# Patient Record
(demographics unavailable — no encounter records)

---

## 2017-06-24 NOTE — NUR
C/C CHEST PAIN / PRESSURE SINCE 0500 THIS AM. NON RADIATING. PT DESCRIBES 7/10. 
RR EVEN AND UNLABORED. PT AAO X4, AMBULATORY WITH STEADY GAIT. VSS. DR CAMARILLO 
AT BEDSIDE FOR EVAL. PT PLACED IN GOWN AND MONITOR.

## 2017-06-24 NOTE — NUR
MD AT  TO UPDATE PT WITH TESTS RESULTS. NEW ORDERS RECEIVED, PT MEDICATED AS 
ORDERED. WILL MONITOR.

## 2017-08-25 NOTE — NUR
MS RN ADMISSION NOTE

PATIENT IS ALERT AND ORIENTED x4. PATIENT STATES 8/10 PAIN, ABDOMINAL AREA. PATIENT 
MEDICATION GIVEN. NO SOB OR DISTRESS NOTED. CALL LIGHT WITHIN REACH. SAFETY MEASURES 
IMPLEMENTED. IV ON LEFT AC INTACT AND PATENT NO REDNESS OR SWELLING NOTED. ABLE TO 
COMMUNICATE NEEDS. POST-OP LAP MARY DONE BY DR. DENT. ALL BELONGINGS ACCOUNTED FOR. WILL 
CONTINUE TO MONITOR

## 2017-08-25 NOTE — NUR
MS RN NOTE:



PATIENT RESTING IN BED, NO ACUTE DISTRESS NOTED, MOM AT BEDSIDE. BREATHING EVEN AND 
UNLABORED, NO SOB NOTED. IV TO LAC IN PLACE. PATIENT COMPLAINS OF NAUSEA/VOMITING EARLIER 
WITH ABOUT 300ML OF HER DINNER. PATIENT ALREADY RECEIVED ZOFRAN AT 1800. PATIENT COMPLAINS 
OF PAIN 6/10 TO ABDOMEN, NORCO 5/325MG 1 TAB ORAL GIVEN PER MD ORDER. INCISION SITES TO 
ABDOMEN, CLEAN AND DRY, NO BLEEDING NOTED. ICE BAG AT BEDSIDE. HOB ELEVATED. BED LOCKED AND 
IN LOWEST POSITION, CALL LIGHT IN REACH. WILL CONTINUE TO MONITOR.

## 2017-08-25 NOTE — NUR
MS RN CLOSING NOTE

PATIENT IS ALERT AND ORIENTED x4. NO PAIN AT THIS TIME. NO SOB OR DISTRESS NOTED. CALL LIGHT 
WITHIN REACH AT ALL TIMES. SAFETY MEASURES IMPLEMENTED. ABLE TO COMMUNICATE NEEDS. IV INTACT 
AND PATENT NO REDNESS OR SWELLING NOTED. WILL ENDORSE TO NIGHT SHIFT NURSE

## 2017-08-26 NOTE — NUR
MS RN OPENING NOTE



REPORT RECEIVED ON THE PATIENT AT THE BEDSIDE. PATIENT IS A/O X 4 AND COOPERATIVE. PATIENT 
IS IN BED AWAKE AND RESPONSIVE. VS WNL

WILL CONTINUE TO MONITOR. BED IS LOCKED IN LOWEST POSITION. SIDE RAILS UP X 2. ALL NEEDS 
WITHIN REACH.

## 2017-08-26 NOTE — NUR
MS RN NOTE

CLARIFIED MAGNESIUM ORDER WITH THE PHARMACY. ORDER CHANGED TO MAGNESIUM OXIDE 800 MG ONCE.

## 2017-08-26 NOTE — NUR
MS/RN NOTES



PT. IS LYING IN BED RESTING. BREATHING EVEN AND UNLABORED, NO SOB, RESPIRATORY DISTRESS OR 
COMPLAINTS OF PAIN NOTED AT THIS TIME. NO COMPLAINTS OF N/V NOTED AT THIS TIME. PT. IS S/P 
LAP MARY 8/[address] DR[address] DENT. PT. WITH INCISION SITES TO ABDOMEN, CLEAN AND DRY, NO 
BLEEDING OR DRAINAGE NOTED. PT. WITH LEFT AC 20 GAUGE IV SALINE LOCK PRESENT, PATENT AND 
INTACT. PT. REFUSING IV FLUIDS AT THIS TIME. PT. STATES SHE IS DRINKING A LOT OF WATER. BED 
IN LOWEST POSITION, SIDE RAILS UP X2, CALL LIGHT WITHIN REACH, WILL ENDORSE TO DAYSHIFT 
NURSE FOR CONTINUITY OF CARE.

## 2017-08-26 NOTE — NUR
MS RN NOTE:



CLARIFIED PATIENT ANTICOAGULANT MEDICATIONS. PER PATIENT, SHE IS NO LONGER ON LOVENOX AND IS 
ON XARELTO 20MG ORAL DAILY. SPOKE TO ON CALL MD, DR. SUAREZ AND RECEIVED ORDERS TO ADJUST 
MEDICATION. DISCONTINUE LOVENOX AND PATIENT TO START XARELTO 20MG ORAL DAILY AT 5PM. ORDER 
NOTED AND CARRIED OUT.

## 2017-08-26 NOTE — NUR
MS/RN NOTES



RECEIVED REPORT FROM KHADRA HELMS. RECEIVED PT. LYING IN BED RESTING. BREATHING EVEN AND 
UNLABORED ON ROOM AIR. NO SOB, RESPIRATORY DISTRESS OR S/S OF PAIN NOTED AT THIS TIME. WILL 
CONTINUE TO MONITOR.

## 2017-08-26 NOTE — NUR
MS/RN NOTES



PT. REFUSING MRSA SWAB TO BE DONE. EDUCATED PT. ON IMPORTANCE. PT. CONTINUES TO REFUSE. WILL 
CONTINUE TO MONITOR.

## 2017-08-26 NOTE — NUR
MS RN NOTE:



PATIENT RESTING IN BED, NO ACUTE DISTRESS NOTED. REPORT GIVEN TO LYNNE FOR CONTINUATION OF 
CARE.

## 2017-08-26 NOTE — NUR
MS RN DISCHARGE NOTE

PATIENT HAS BEEN DISCHARGED BY DR. MCKEON. DISCHARGE ORDER RECEIVE. PATIENT PRIVIDED WITH 
D/C PAPERWORK/EDUCATION. ALL BELONGINGS ARE ACCOUNTED FOR. VS WNL, PATIENT IS STABLE. 
PATIENT'S MOTHER, BANDAR, IS TAKING HER HOME. PATIENT WILLED OUT FROM THE HOSPITAL BY THE CNA 
IN STABLE CONDITION.

## 2024-01-17 NOTE — NUR
MS RN NOTE:



PATIENT CONTINUES TO FEEL NAUSEA AND ABDOMINAL PAIN. NORCO 5/325MG 2 TABS ORAL GIVEN PER MD 
ORDER. CALL ON CALL MD, SPOKE TO DR. SUAREZ, RECEIVE ADMIT ORDERS AND ORDERS FOR REGLAN 10MG 
IV. REGLAN 10MG  IV GIVEN PER MD ORDER. WILL CONTINUE TO MONITOR. guidewire recovered/lumen(s) aspirated and flushed/sterile dressing applied/sterile technique, catheter placed/ultrasound guidance with use of sterile gel and probe cove